# Patient Record
Sex: FEMALE | Race: WHITE | NOT HISPANIC OR LATINO | URBAN - METROPOLITAN AREA
[De-identification: names, ages, dates, MRNs, and addresses within clinical notes are randomized per-mention and may not be internally consistent; named-entity substitution may affect disease eponyms.]

---

## 2018-09-20 ENCOUNTER — HISTORICAL (OUTPATIENT)
Dept: ADMINISTRATIVE | Facility: HOSPITAL | Age: 68
End: 2018-09-20

## 2020-08-20 ENCOUNTER — HISTORICAL (OUTPATIENT)
Dept: ADMINISTRATIVE | Facility: HOSPITAL | Age: 70
End: 2020-08-20

## 2020-11-19 ENCOUNTER — HISTORICAL (OUTPATIENT)
Dept: ADMINISTRATIVE | Facility: HOSPITAL | Age: 70
End: 2020-11-19

## 2020-12-17 ENCOUNTER — HISTORICAL (OUTPATIENT)
Dept: ADMINISTRATIVE | Facility: HOSPITAL | Age: 70
End: 2020-12-17

## 2022-04-07 ENCOUNTER — HISTORICAL (OUTPATIENT)
Dept: ADMINISTRATIVE | Facility: HOSPITAL | Age: 72
End: 2022-04-07

## 2022-04-24 VITALS
BODY MASS INDEX: 25.9 KG/M2 | WEIGHT: 155.44 LBS | SYSTOLIC BLOOD PRESSURE: 163 MMHG | DIASTOLIC BLOOD PRESSURE: 95 MMHG | HEIGHT: 65 IN

## 2023-08-15 ENCOUNTER — TELEPHONE (OUTPATIENT)
Dept: ORTHOPEDICS | Facility: CLINIC | Age: 73
End: 2023-08-15

## 2023-08-15 NOTE — TELEPHONE ENCOUNTER
Patient called regarding premedicate.       I called and spoke with patient. I informed her that her sx was in 2020 and she no longer needs to pre medicate after 2 years of her sx.     Patient understood

## 2023-11-19 ENCOUNTER — ANESTHESIA (OUTPATIENT)
Dept: SURGERY | Facility: HOSPITAL | Age: 73
End: 2023-11-19
Payer: MEDICARE

## 2023-11-19 ENCOUNTER — ANESTHESIA EVENT (OUTPATIENT)
Dept: SURGERY | Facility: HOSPITAL | Age: 73
End: 2023-11-19
Payer: MEDICARE

## 2023-11-19 ENCOUNTER — HOSPITAL ENCOUNTER (EMERGENCY)
Facility: HOSPITAL | Age: 73
Discharge: HOME | End: 2023-11-19
Attending: STUDENT IN AN ORGANIZED HEALTH CARE EDUCATION/TRAINING PROGRAM | Admitting: ORTHOPAEDIC SURGERY
Payer: MEDICARE

## 2023-11-19 VITALS
HEART RATE: 70 BPM | BODY MASS INDEX: 25.83 KG/M2 | RESPIRATION RATE: 18 BRPM | HEIGHT: 65 IN | OXYGEN SATURATION: 95 % | SYSTOLIC BLOOD PRESSURE: 133 MMHG | WEIGHT: 155 LBS | DIASTOLIC BLOOD PRESSURE: 87 MMHG

## 2023-11-19 DIAGNOSIS — S73.005A HIP DISLOCATION, LEFT: ICD-10-CM

## 2023-11-19 DIAGNOSIS — S73.005A DISLOCATION OF LEFT HIP, INITIAL ENCOUNTER: Primary | ICD-10-CM

## 2023-11-19 LAB
ALBUMIN SERPL-MCNC: 4.2 G/DL (ref 3.4–4.8)
ALBUMIN/GLOB SERPL: 2.1 RATIO (ref 1.1–2)
ALP SERPL-CCNC: 78 UNIT/L (ref 40–150)
ALT SERPL-CCNC: 37 UNIT/L (ref 0–55)
AST SERPL-CCNC: 31 UNIT/L (ref 5–34)
BASOPHILS # BLD AUTO: 0.03 X10(3)/MCL
BASOPHILS NFR BLD AUTO: 0.6 %
BILIRUB SERPL-MCNC: 0.8 MG/DL
BUN SERPL-MCNC: 25.1 MG/DL (ref 9.8–20.1)
CALCIUM SERPL-MCNC: 9.2 MG/DL (ref 8.4–10.2)
CHLORIDE SERPL-SCNC: 104 MMOL/L (ref 98–107)
CO2 SERPL-SCNC: 20 MMOL/L (ref 23–31)
CREAT SERPL-MCNC: 0.83 MG/DL (ref 0.55–1.02)
EOSINOPHIL # BLD AUTO: 0.07 X10(3)/MCL (ref 0–0.9)
EOSINOPHIL NFR BLD AUTO: 1.3 %
ERYTHROCYTE [DISTWIDTH] IN BLOOD BY AUTOMATED COUNT: 12.3 % (ref 11.5–17)
GFR SERPLBLD CREATININE-BSD FMLA CKD-EPI: >60 MLS/MIN/1.73/M2
GLOBULIN SER-MCNC: 2 GM/DL (ref 2.4–3.5)
GLUCOSE SERPL-MCNC: 169 MG/DL (ref 82–115)
HCT VFR BLD AUTO: 41 % (ref 37–47)
HGB BLD-MCNC: 14.3 G/DL (ref 12–16)
IMM GRANULOCYTES # BLD AUTO: 0.06 X10(3)/MCL (ref 0–0.04)
IMM GRANULOCYTES NFR BLD AUTO: 1.1 %
LYMPHOCYTES # BLD AUTO: 0.91 X10(3)/MCL (ref 0.6–4.6)
LYMPHOCYTES NFR BLD AUTO: 17.1 %
MCH RBC QN AUTO: 32.2 PG (ref 27–31)
MCHC RBC AUTO-ENTMCNC: 34.9 G/DL (ref 33–36)
MCV RBC AUTO: 92.3 FL (ref 80–94)
MONOCYTES # BLD AUTO: 0.49 X10(3)/MCL (ref 0.1–1.3)
MONOCYTES NFR BLD AUTO: 9.2 %
NEUTROPHILS # BLD AUTO: 3.77 X10(3)/MCL (ref 2.1–9.2)
NEUTROPHILS NFR BLD AUTO: 70.7 %
NRBC BLD AUTO-RTO: 0 %
PLATELET # BLD AUTO: 158 X10(3)/MCL (ref 130–400)
PMV BLD AUTO: 10.8 FL (ref 7.4–10.4)
POTASSIUM SERPL-SCNC: 3.8 MMOL/L (ref 3.5–5.1)
PROT SERPL-MCNC: 6.2 GM/DL (ref 5.8–7.6)
RBC # BLD AUTO: 4.44 X10(6)/MCL (ref 4.2–5.4)
SODIUM SERPL-SCNC: 140 MMOL/L (ref 136–145)
TROPONIN I SERPL-MCNC: <0.01 NG/ML (ref 0–0.04)
WBC # SPEC AUTO: 5.33 X10(3)/MCL (ref 4.5–11.5)

## 2023-11-19 PROCEDURE — D9220A PRA ANESTHESIA: ICD-10-PCS | Mod: CRNA,,, | Performed by: NURSE ANESTHETIST, CERTIFIED REGISTERED

## 2023-11-19 PROCEDURE — 37000009 HC ANESTHESIA EA ADD 15 MINS: Performed by: ORTHOPAEDIC SURGERY

## 2023-11-19 PROCEDURE — 36000711: Performed by: ORTHOPAEDIC SURGERY

## 2023-11-19 PROCEDURE — 99285 PR EMERGENCY DEPT VISIT,LEVEL V: ICD-10-PCS | Mod: 57,,, | Performed by: ORTHOPAEDIC SURGERY

## 2023-11-19 PROCEDURE — 99285 EMERGENCY DEPT VISIT HI MDM: CPT | Mod: 25

## 2023-11-19 PROCEDURE — 96374 THER/PROPH/DIAG INJ IV PUSH: CPT

## 2023-11-19 PROCEDURE — 71000015 HC POSTOP RECOV 1ST HR: Performed by: ORTHOPAEDIC SURGERY

## 2023-11-19 PROCEDURE — 27266 TREAT HIP DISLOCATION: CPT | Mod: LT,,, | Performed by: ORTHOPAEDIC SURGERY

## 2023-11-19 PROCEDURE — 27266 PR CLOSED RX POST HIP FIX DISLOC,ANESTH: ICD-10-PCS | Mod: LT,,, | Performed by: ORTHOPAEDIC SURGERY

## 2023-11-19 PROCEDURE — 96361 HYDRATE IV INFUSION ADD-ON: CPT | Mod: 59

## 2023-11-19 PROCEDURE — 96375 TX/PRO/DX INJ NEW DRUG ADDON: CPT

## 2023-11-19 PROCEDURE — 37000008 HC ANESTHESIA 1ST 15 MINUTES: Performed by: ORTHOPAEDIC SURGERY

## 2023-11-19 PROCEDURE — 36000704 HC OR TIME LEV I 1ST 15 MIN: Performed by: ORTHOPAEDIC SURGERY

## 2023-11-19 PROCEDURE — 71000033 HC RECOVERY, INTIAL HOUR: Performed by: ORTHOPAEDIC SURGERY

## 2023-11-19 PROCEDURE — D9220A PRA ANESTHESIA: ICD-10-PCS | Mod: ANES,,, | Performed by: ANESTHESIOLOGY

## 2023-11-19 PROCEDURE — D9220A PRA ANESTHESIA: Mod: ANES,,, | Performed by: ANESTHESIOLOGY

## 2023-11-19 PROCEDURE — 99285 EMERGENCY DEPT VISIT HI MDM: CPT | Mod: 57,,, | Performed by: ORTHOPAEDIC SURGERY

## 2023-11-19 PROCEDURE — 93005 ELECTROCARDIOGRAM TRACING: CPT

## 2023-11-19 PROCEDURE — 63600175 PHARM REV CODE 636 W HCPCS: Performed by: NURSE ANESTHETIST, CERTIFIED REGISTERED

## 2023-11-19 PROCEDURE — 63600175 PHARM REV CODE 636 W HCPCS: Performed by: STUDENT IN AN ORGANIZED HEALTH CARE EDUCATION/TRAINING PROGRAM

## 2023-11-19 PROCEDURE — 36000705 HC OR TIME LEV I EA ADD 15 MIN: Performed by: ORTHOPAEDIC SURGERY

## 2023-11-19 PROCEDURE — 36000710: Performed by: ORTHOPAEDIC SURGERY

## 2023-11-19 PROCEDURE — 80053 COMPREHEN METABOLIC PANEL: CPT | Performed by: STUDENT IN AN ORGANIZED HEALTH CARE EDUCATION/TRAINING PROGRAM

## 2023-11-19 PROCEDURE — 84484 ASSAY OF TROPONIN QUANT: CPT | Performed by: STUDENT IN AN ORGANIZED HEALTH CARE EDUCATION/TRAINING PROGRAM

## 2023-11-19 PROCEDURE — 85025 COMPLETE CBC W/AUTO DIFF WBC: CPT | Performed by: STUDENT IN AN ORGANIZED HEALTH CARE EDUCATION/TRAINING PROGRAM

## 2023-11-19 PROCEDURE — D9220A PRA ANESTHESIA: Mod: CRNA,,, | Performed by: NURSE ANESTHETIST, CERTIFIED REGISTERED

## 2023-11-19 PROCEDURE — 25000003 PHARM REV CODE 250: Performed by: ANESTHESIOLOGY

## 2023-11-19 PROCEDURE — 25000003 PHARM REV CODE 250: Performed by: NURSE ANESTHETIST, CERTIFIED REGISTERED

## 2023-11-19 PROCEDURE — 63600175 PHARM REV CODE 636 W HCPCS: Performed by: ANESTHESIOLOGY

## 2023-11-19 RX ORDER — ACETAMINOPHEN 10 MG/ML
1000 INJECTION, SOLUTION INTRAVENOUS ONCE
Status: CANCELLED | OUTPATIENT
Start: 2023-11-19 | End: 2023-11-19

## 2023-11-19 RX ORDER — MEPERIDINE HYDROCHLORIDE 25 MG/ML
12.5 INJECTION INTRAMUSCULAR; INTRAVENOUS; SUBCUTANEOUS ONCE AS NEEDED
Status: CANCELLED | OUTPATIENT
Start: 2023-11-19 | End: 2023-11-20

## 2023-11-19 RX ORDER — DEXAMETHASONE SODIUM PHOSPHATE 4 MG/ML
INJECTION, SOLUTION INTRA-ARTICULAR; INTRALESIONAL; INTRAMUSCULAR; INTRAVENOUS; SOFT TISSUE
Status: DISCONTINUED | OUTPATIENT
Start: 2023-11-19 | End: 2023-11-19

## 2023-11-19 RX ORDER — PROPOFOL 10 MG/ML
200 VIAL (ML) INTRAVENOUS ONCE
Status: COMPLETED | OUTPATIENT
Start: 2023-11-19 | End: 2023-11-19

## 2023-11-19 RX ORDER — ONDANSETRON 2 MG/ML
INJECTION INTRAMUSCULAR; INTRAVENOUS
Status: DISCONTINUED | OUTPATIENT
Start: 2023-11-19 | End: 2023-11-19

## 2023-11-19 RX ORDER — ROCURONIUM BROMIDE 10 MG/ML
INJECTION, SOLUTION INTRAVENOUS
Status: DISCONTINUED | OUTPATIENT
Start: 2023-11-19 | End: 2023-11-19

## 2023-11-19 RX ORDER — SUCCINYLCHOLINE CHLORIDE 20 MG/ML
INJECTION INTRAMUSCULAR; INTRAVENOUS
Status: DISCONTINUED | OUTPATIENT
Start: 2023-11-19 | End: 2023-11-19

## 2023-11-19 RX ORDER — PROPOFOL 10 MG/ML
100 VIAL (ML) INTRAVENOUS ONCE
Status: DISCONTINUED | OUTPATIENT
Start: 2023-11-19 | End: 2023-11-19

## 2023-11-19 RX ORDER — PROPOFOL 10 MG/ML
VIAL (ML) INTRAVENOUS
Status: DISCONTINUED | OUTPATIENT
Start: 2023-11-19 | End: 2023-11-19

## 2023-11-19 RX ORDER — FENTANYL CITRATE 50 UG/ML
INJECTION, SOLUTION INTRAMUSCULAR; INTRAVENOUS
Status: DISCONTINUED | OUTPATIENT
Start: 2023-11-19 | End: 2023-11-19

## 2023-11-19 RX ORDER — LIDOCAINE HYDROCHLORIDE 20 MG/ML
INJECTION, SOLUTION EPIDURAL; INFILTRATION; INTRACAUDAL; PERINEURAL
Status: DISCONTINUED | OUTPATIENT
Start: 2023-11-19 | End: 2023-11-19

## 2023-11-19 RX ORDER — MIDAZOLAM HYDROCHLORIDE 1 MG/ML
2 INJECTION INTRAMUSCULAR; INTRAVENOUS ONCE AS NEEDED
Status: CANCELLED | OUTPATIENT
Start: 2023-11-19 | End: 2035-04-17

## 2023-11-19 RX ORDER — CEFAZOLIN SODIUM 2 G/50ML
2 SOLUTION INTRAVENOUS ONCE
Status: DISCONTINUED | OUTPATIENT
Start: 2023-11-19 | End: 2023-11-19

## 2023-11-19 RX ORDER — ONDANSETRON 2 MG/ML
4 INJECTION INTRAMUSCULAR; INTRAVENOUS
Status: COMPLETED | OUTPATIENT
Start: 2023-11-19 | End: 2023-11-19

## 2023-11-19 RX ORDER — METHOCARBAMOL 500 MG/1
500 TABLET, FILM COATED ORAL 3 TIMES DAILY PRN
Qty: 30 TABLET | Refills: 0 | Status: SHIPPED | OUTPATIENT
Start: 2023-11-19 | End: 2023-11-29

## 2023-11-19 RX ORDER — METOCLOPRAMIDE HYDROCHLORIDE 5 MG/ML
10 INJECTION INTRAMUSCULAR; INTRAVENOUS ONCE
Status: COMPLETED | OUTPATIENT
Start: 2023-11-19 | End: 2023-11-19

## 2023-11-19 RX ORDER — SODIUM CHLORIDE, SODIUM LACTATE, POTASSIUM CHLORIDE, CALCIUM CHLORIDE 600; 310; 30; 20 MG/100ML; MG/100ML; MG/100ML; MG/100ML
INJECTION, SOLUTION INTRAVENOUS CONTINUOUS
Status: CANCELLED | OUTPATIENT
Start: 2023-11-19

## 2023-11-19 RX ORDER — SCOLOPAMINE TRANSDERMAL SYSTEM 1 MG/1
1 PATCH, EXTENDED RELEASE TRANSDERMAL ONCE
Status: DISCONTINUED | OUTPATIENT
Start: 2023-11-19 | End: 2023-11-19 | Stop reason: HOSPADM

## 2023-11-19 RX ORDER — HYDROMORPHONE HYDROCHLORIDE 2 MG/ML
0.2 INJECTION, SOLUTION INTRAMUSCULAR; INTRAVENOUS; SUBCUTANEOUS EVERY 5 MIN PRN
Status: CANCELLED | OUTPATIENT
Start: 2023-11-19

## 2023-11-19 RX ORDER — HYDROMORPHONE HYDROCHLORIDE 2 MG/ML
1 INJECTION, SOLUTION INTRAMUSCULAR; INTRAVENOUS; SUBCUTANEOUS
Status: COMPLETED | OUTPATIENT
Start: 2023-11-19 | End: 2023-11-19

## 2023-11-19 RX ORDER — PROPOFOL 10 MG/ML
VIAL (ML) INTRAVENOUS CODE/TRAUMA/SEDATION MEDICATION
Status: COMPLETED | OUTPATIENT
Start: 2023-11-19 | End: 2023-11-19

## 2023-11-19 RX ORDER — TRAMADOL HYDROCHLORIDE 50 MG/1
50 TABLET ORAL EVERY 6 HOURS PRN
Qty: 28 TABLET | Refills: 0 | Status: SHIPPED | OUTPATIENT
Start: 2023-11-19 | End: 2023-11-26

## 2023-11-19 RX ORDER — SODIUM CITRATE AND CITRIC ACID MONOHYDRATE 334; 500 MG/5ML; MG/5ML
30 SOLUTION ORAL
Status: CANCELLED | OUTPATIENT
Start: 2023-11-19

## 2023-11-19 RX ORDER — ONDANSETRON 4 MG/1
4 TABLET, ORALLY DISINTEGRATING ORAL EVERY 6 HOURS PRN
Status: CANCELLED | OUTPATIENT
Start: 2023-11-19

## 2023-11-19 RX ORDER — LIDOCAINE HYDROCHLORIDE 10 MG/ML
1 INJECTION, SOLUTION EPIDURAL; INFILTRATION; INTRACAUDAL; PERINEURAL ONCE
Status: CANCELLED | OUTPATIENT
Start: 2023-11-19 | End: 2023-11-19

## 2023-11-19 RX ADMIN — PROPOFOL 70 MG: 10 INJECTION, EMULSION INTRAVENOUS at 03:11

## 2023-11-19 RX ADMIN — ROCURONIUM BROMIDE 5 MG: 10 SOLUTION INTRAVENOUS at 04:11

## 2023-11-19 RX ADMIN — HYDROMORPHONE HYDROCHLORIDE 1 MG: 2 INJECTION INTRAMUSCULAR; INTRAVENOUS; SUBCUTANEOUS at 01:11

## 2023-11-19 RX ADMIN — LIDOCAINE HYDROCHLORIDE 80 MG: 20 INJECTION, SOLUTION EPIDURAL; INFILTRATION; INTRACAUDAL; PERINEURAL at 04:11

## 2023-11-19 RX ADMIN — DEXAMETHASONE SODIUM PHOSPHATE 4 MG: 4 INJECTION, SOLUTION INTRA-ARTICULAR; INTRALESIONAL; INTRAMUSCULAR; INTRAVENOUS; SOFT TISSUE at 04:11

## 2023-11-19 RX ADMIN — ONDANSETRON 4 MG: 2 INJECTION INTRAMUSCULAR; INTRAVENOUS at 04:11

## 2023-11-19 RX ADMIN — PROPOFOL 30 MG: 10 INJECTION, EMULSION INTRAVENOUS at 03:11

## 2023-11-19 RX ADMIN — FENTANYL CITRATE 50 MCG: 50 INJECTION, SOLUTION INTRAMUSCULAR; INTRAVENOUS at 04:11

## 2023-11-19 RX ADMIN — PROPOFOL 150 MG: 10 INJECTION, EMULSION INTRAVENOUS at 04:11

## 2023-11-19 RX ADMIN — PROPOFOL 20 MG: 10 INJECTION, EMULSION INTRAVENOUS at 03:11

## 2023-11-19 RX ADMIN — PROPOFOL 10 MG: 10 INJECTION, EMULSION INTRAVENOUS at 03:11

## 2023-11-19 RX ADMIN — ROCURONIUM BROMIDE 25 MG: 10 SOLUTION INTRAVENOUS at 04:11

## 2023-11-19 RX ADMIN — METOCLOPRAMIDE 10 MG: 5 INJECTION, SOLUTION INTRAMUSCULAR; INTRAVENOUS at 07:11

## 2023-11-19 RX ADMIN — SODIUM CHLORIDE: 9 INJECTION, SOLUTION INTRAVENOUS at 04:11

## 2023-11-19 RX ADMIN — SUGAMMADEX 200 MG: 100 INJECTION, SOLUTION INTRAVENOUS at 05:11

## 2023-11-19 RX ADMIN — ONDANSETRON 4 MG: 2 INJECTION INTRAMUSCULAR; INTRAVENOUS at 01:11

## 2023-11-19 RX ADMIN — SUCCINYLCHOLINE CHLORIDE 140 MG: 20 INJECTION, SOLUTION INTRAMUSCULAR; INTRAVENOUS at 04:11

## 2023-11-19 RX ADMIN — SCOPOLAMINE 1 PATCH: 1 PATCH TRANSDERMAL at 07:11

## 2023-11-19 RX ADMIN — SODIUM CHLORIDE, POTASSIUM CHLORIDE, SODIUM LACTATE AND CALCIUM CHLORIDE 1000 ML: 600; 310; 30; 20 INJECTION, SOLUTION INTRAVENOUS at 01:11

## 2023-11-19 NOTE — ANESTHESIA PROCEDURE NOTES
Intubation    Date/Time: 11/19/2023 4:53 PM    Performed by: Jonathan Mccormick CRNA  Authorized by: Jonathan Mccormick CRNA    Intubation:     Induction:  Rapid sequence induction    Intubated:  Postinduction    Mask Ventilation:  Not attempted    Attempts:  1    Attempted By:  CRNA    Method of Intubation:  Direct    Blade:  Cantu 2    Laryngeal View Grade: Grade I - full view of cords      Difficult Airway Encountered?: No      Complications:  None    Airway Device:  Oral endotracheal tube    Airway Device Size:  7.0    Style/Cuff Inflation:  Cuffed (inflated to minimal occlusive pressure)    Tube secured:  22    Secured at:  The lips    Placement Verified By:  Capnometry    Complicating Factors:  None    Findings Post-Intubation:  BS equal bilateral and atraumatic/condition of teeth unchanged

## 2023-11-19 NOTE — ED PROVIDER NOTES
Encounter Date: 11/19/2023    SCRIBE #1 NOTE: I, Jim Jeff, am scribing for, and in the presence of,  Bryson Puentes MD. I have scribed the following portions of the note - Other sections scribed: HPI, ROS, PE.       History     Chief Complaint   Patient presents with    Hip Pain     AIR [facility] from home, was bending over and felt a pop in left hip area with pain, fell over without trauma to head, LOC, no blood thinners. GCS 15. Reports pain to left hip. [facility] gave morphine 10mg then ketamine 60mg en route. Bilateral hips replaced approximately 20 years ago     71 y/o female presents to the ED via [facility] with left hip pain. Pt reports that she bent over and her left hip popped inward. She denies falling and LOC. Pt notes that she has had something similar happen to her right hip. Given 10 of morphine and 60 of ketamine en route by [facility]. Surgical history of bilateral hip replacement 21 years ago.     The history is provided by the patient and the EMS personnel. No  was used.     Review of patient's allergies indicates:   Allergen Reactions    Oxycodone-acetaminophen     Tetracycline      No past medical history on file.  Past Surgical History:   Procedure Laterality Date    CLOSED REDUCTION OF INJURY OF HIP Left 11/19/2023    Procedure: CLOSED REDUCTION, HIP;  Surgeon: Gutierrez Joiner MD;  Location: [facility];  Service: Orthopedics;  Laterality: Left;     No family history on file.     Review of Systems   Constitutional:  Negative for chills and fever.   HENT:  Negative for congestion, drooling and sore throat.    Eyes:  Negative for pain and visual disturbance.   Respiratory:  Negative for chest tightness, shortness of breath and wheezing.    Cardiovascular:  Negative for chest pain, palpitations and leg swelling.   Gastrointestinal:  Negative for abdominal pain, nausea and vomiting.   Genitourinary:  Negative for dysuria and hematuria.   Musculoskeletal:  Positive for arthralgias  (left hip). Negative for back pain, neck pain and neck stiffness.   Skin:  Negative for pallor and rash.   Neurological:  Negative for weakness and numbness.   Hematological:  Does not bruise/bleed easily.       Physical Exam     Initial Vitals [11/19/23 1327]   BP Pulse Resp Temp SpO2   137/66 92 (!) 22 -- 95 %      MAP       --         Physical Exam    Nursing note and vitals reviewed.  Constitutional: She appears well-developed and well-nourished. She is not diaphoretic. No distress.   HENT:   Head: Normocephalic and atraumatic.   Nose: Nose normal.   Mouth/Throat: Oropharynx is clear and moist.   Eyes: EOM are normal. Pupils are equal, round, and reactive to light.   Neck: Neck supple.   Normal range of motion.  Cardiovascular:  Normal rate and regular rhythm.           No murmur heard.  Pulmonary/Chest: Breath sounds normal. No respiratory distress. She has no wheezes. She has no rales.   Abdominal: Abdomen is soft. She exhibits no distension. There is no abdominal tenderness.   Musculoskeletal:      Cervical back: Normal range of motion and neck supple.      Comments: The left leg is shortened and internally rotated, intact pulse and sensation, chronic decreased weakness from previous neuropathy  No external signs of trauma     Neurological: She is alert and oriented to person, place, and time. No cranial nerve deficit or sensory deficit.   Skin: Skin is warm. Capillary refill takes less than 2 seconds. No rash noted.         ED Course   Orthopedic Injury    Date/Time: 11/19/2023 4:04 PM    Performed by: Bryson Puentes MD  Authorized by: Bryson Puentes MD    Location procedure was performed:  [facility] EMERGENCY DEPARTMENT  Pre-operative diagnosis:  Prosthetic hip dislocation  Post-operative diagnosis:  Prosthetic hip dislocation  Injury:     Injury location:  Hip    Location details:  Left hip    Injury type:  Dislocation      Pre-procedure assessment:     Distal perfusion: normal      Neurological  function: normal      Range of motion: reduced      Patient sedated?: Yes      ASA Class:  Class 3 - Systemic Illness with functional impairment.    Mallampati Score:  Class 2 - Visualization of the soft palate, fauces, and uvula.    Patient/Family history of anesthesia or sedation complications: No      Sedation type: moderate (conscious) sedation      Sedation:  Propofol    Sedation start:  11/19/2023 3:30 PM    Sedation end:  11/19/2023 4:00 PM    Vital signs: Vital signs monitored during sedation        Selections made in this section will also lock the Injury type section above.:     Manipulation performed?: Yes      Reduction method:  Traction and counter traction, Allis maneuver, abduction, extension and external rotation    Reduction method:  Traction and counter traction, Allis maneuver, abduction, extension and external rotation    Reduction method:  Traction and counter traction, Allis maneuver, abduction, extension and external rotation    Reduction method:  Traction and counter traction, Allis maneuver, abduction, extension and external rotation    Reduction method:  Traction and counter traction, Allis maneuver, abduction, extension and external rotation    Reduction method:  Traction and counter traction, Allis maneuver, abduction, extension and external rotation    Reduction successful?: No    Post-procedure assessment:     Neurovascular status: Neurovascularly intact      Distal perfusion: normal      Neurological function: normal      Range of motion: unchanged      Patient tolerance:  Patient tolerated the procedure well with no immediate complications    Labs Reviewed   COMPREHENSIVE METABOLIC PANEL - Abnormal; Notable for the following components:       Result Value    Carbon Dioxide 20 (*)     Glucose Level 169 (*)     Blood Urea Nitrogen 25.1 (*)     Globulin 2.0 (*)     Albumin/Globulin Ratio 2.1 (*)     All other components within normal limits   CBC WITH DIFFERENTIAL - Abnormal; Notable for  the following components:    MCH 32.2 (*)     MPV 10.8 (*)     IG# 0.06 (*)     All other components within normal limits   TROPONIN I - Normal   CBC W/ AUTO DIFFERENTIAL    Narrative:     The following orders were created for panel order CBC auto differential.  Procedure                               Abnormality         Status                     ---------                               -----------         ------                     CBC with Differential[[id number]]       Abnormal            Final result                 Please view results for these tests on the individual orders.        ECG Results              EKG 12-lead (Final result)  Result time 11/19/23 14:26:16      Final result by Interface, Lab [facility] (11/19/23 14:26:16)                   Narrative:    Test Reason : S73.005A,    Vent. Rate : 079 BPM     Atrial Rate : 079 BPM     P-R Int : 140 ms          QRS Dur : 070 ms      QT Int : 382 ms       P-R-T Axes : 073 059 061 degrees     QTc Int : 438 ms    Normal sinus rhythm  Normal ECG  No previous ECGs available  Confirmed by Floyd Freedman MD ([id number]) on 11/19/2023 2:26:08 PM    Referred By:             Confirmed By:Floyd Freedman MD                                  Imaging Results              SURG FL Surgery Fluoro Usage (Final result)  Result time 11/19/23 17:16:56      Final result by Access, Silent  (11/19/23 17:16:56)                   Narrative:    See OP Notes for results.     IMPRESSION: See OP Notes for results.             This procedure was auto-finalized by: Virtual Radiologist                                     X-Ray Hip 2 or 3 views Left (with Pelvis when performed) (Final result)  Result time 11/19/23 16:28:45      Final result by Hima Dawkins MD (11/19/23 16:28:45)                   Impression:      No significant interval change.      Electronically signed by: Hima Dawkins  Date:    11/19/2023  Time:    16:28               Narrative:    EXAMINATION:  XR HIP WITH PELVIS  WHEN PERFORMED, 2 OR 3 VIEWS LEFT    CLINICAL HISTORY:  Hip dislocation.    TECHNIQUE:  Three views    COMPARISON:  Same date    FINDINGS:  There is again superior dislocation of the left femoral component of the hip arthroplasty.  Demineralization of bones.                                       X-Ray Chest AP Portable (Final result)  Result time 11/19/23 14:18:34      Final result by Sawyer Chisholm MD (11/19/23 14:18:34)                   Impression:      No acute findings.      Electronically signed by: Sawyer Chisholm  Date:    11/19/2023  Time:    14:18               Narrative:    EXAMINATION:  XR CHEST AP PORTABLE    CLINICAL HISTORY:  Unspecified dislocation of left hip, initial encounter    COMPARISON:  6 October 2020    FINDINGS:  Portable frontal view of the chest was obtained. Left lung base incompletely imaged.  The heart is not enlarged.  Lungs grossly clear.  There is no pneumothorax or significant effusion.                                       X-Ray Hip 2 or 3 views Left (with Pelvis when performed) (Final result)  Result time 11/19/23 14:13:11      Final result by Sawyer Chisholm MD (11/19/23 14:13:11)                   Impression:      Superior dislocation left hip arthroplasty.      Electronically signed by: Sawyer Chisholm  Date:    11/19/2023  Time:    14:13               Narrative:    EXAMINATION:  XR HIP WITH PELVIS WHEN PERFORMED, 2 OR 3 VIEWS LEFT    CLINICAL HISTORY:  hip dislocation;    COMPARISON:  20 September 2018    FINDINGS:  Frontal view of the pelvis with two views of the left hip.  There are bilateral hip arthroplasties.  There is superior displacement of the left hip arthroplasty femoral component.  No acute fracture is evident..                                       Medications   HYDROmorphone (PF) injection 1 mg (1 mg Intravenous Given 11/19/23 1336)   ondansetron injection 4 mg (4 mg Intravenous Given 11/19/23 1336)   lactated ringers bolus 1,000 mL (0 mLs Intravenous Stopped  11/19/23 1436)   propofol (DIPRIVAN) 10 mg/mL IVP (0 mg Intravenous Override Pull 11/19/23 1530)   propofol (DIPRIVAN) 10 mg/mL IVP (30 mg Intravenous Given 11/19/23 1546)   metoclopramide HCl injection 10 mg (10 mg Intravenous Given 11/19/23 1919)     Medical Decision Making  Problems Addressed:  Hip dislocation, left: acute illness or injury with systemic symptoms    Amount and/or Complexity of Data Reviewed  Labs: ordered.  Radiology: ordered.  ECG/medicine tests: ordered and independent interpretation performed. Decision-making details documented in ED Course.    Risk  Prescription drug management.      Differential diagnosis (includes but is not limited to):   Hip dislocation, fracture, periprosthetic fracture, neurovascular injury    MDM Narrative  72-year-old female arrives by air Care for a left hip injury, presumed dislocation.  Patient does have a history of bilateral prosthetic hips.  She states she bent to the side and felt a pop and then her leg gave out.  She is been unable to move it since.  She received 10 mg of morphine and then 60 mg of ketamine EN route with air evac.  The original hip surgeries were over 20 years ago per patient.  X-rays pending.  Continue pain and nausea control.  IV fluid rehydration ordered.  Labs are pending.  Patient will require sedation for reduction attempt.      Update:  X-ray does show what appears to be a dislocation of the patient's prosthetic hip.  Informed consent obtained for both conscious sedation and closed reduction of the left hip dislocation.  Closed reduction at bedside attempted with no significant interval change and unsuccessful reduction.  Orthopedic surgery consulted and has evaluated the patient, they will take the patient to the operating room for reduction.  Patient and family at bedside agree with plan of care and have no questions at this time.  Tentative plan for discharge after reduction from PACU as long as patient remains stable and is doing  well.    Dispo: Discharge    My independent radiology interpretation: as above  Point of care US (independently performed and interpreted):   Decision rules/clinical scoring:     Sepsis Perfusion Assessment:     Amount and/or Complexity of Data Reviewed  Independent historian: EMS   Summary of history: Report received from EMS on arrival  External data reviewed: notes from previous admissions, notes from previous ED visits, and notes from clinic visits  Summary of data reviewed: Prior records reviewed  Risk and benefits of testing: discussed   Labs: ordered and reviewed  Radiology: ordered and independent interpretation performed (see above or ED course)  ECG/medicine tests: ordered and independent interpretation performed (see above or ED course)  Discussion of management or test interpretation with external provider(s): discussed with orthopedic surgery consultant   Summary of discussion: as above    Risk  Prescription drug management   Parenteral controlled substances   Drug therapy requiring intense monitoring for toxicity   Decision regarding hospitalization  Emergency major surgery   Shared decision making     Critical Care  none    Data Reviewed/Counseling: I have personally reviewed the patient's vital signs, nursing notes, and other relevant tests, information, and imaging. I had a detailed discussion regarding the historical points, exam findings, and any diagnostic results supporting the discharge diagnosis. I personally performed the history, PE, MDM and procedures as documented above and agree with the scribe's documentation.    Portions of this note were dictated using voice recognition software. Although it was reviewed for accuracy, some inherent voice recognition errors may have occurred and may be present in this document.         Scribe Attestation:   Scribe #1: I performed the above scribed service and the documentation accurately describes the services I performed. I attest to the accuracy of  the note.    Attending Attestation:           Physician Attestation for Scribe:  Physician Attestation Statement for Scribe #1: I, Bryson Puentes MD, reviewed documentation, as scribed by Jim Jeff in my presence, and it is both accurate and complete.             ED Course as of 11/23/23 1610   Sun Nov 19, 2023   1600 Bedside closed reduction unsuccessful.  Orthopedic surgery consulted and has evaluated the patient.  Dr. Joiner will take the patient to the operating room for reduction. []   1622 EKG independently interpreted by me.  EKG: NSR @ 79, no STEMI, Qtc 438 []      ED Course User Index  [] Bryson Puetnes MD                        Clinical Impression:  Final diagnoses:  [S73.005A] Hip dislocation, left          ED Disposition Condition    Discharge Stable          ED Prescriptions       Medication Sig Dispense Start Date End Date Auth. Provider    traMADoL (ULTRAM) 50 mg tablet Take 1 tablet (50 mg total) by mouth every 6 (six) hours as needed for Pain. 28 tablet 11/19/2023 11/26/2023 Gutierrez Joiner MD    methocarbamoL (ROBAXIN) 500 MG Tab Take 1 tablet (500 mg total) by mouth 3 (three) times daily as needed (spasm). 30 tablet 11/19/2023 11/29/2023 Gutierrez Joiner MD          Follow-up Information       Follow up With Specialties Details Why Contact Info    Dejan Chaidez MD Orthopedic Surgery Follow up in 1 week(s)  [address]  [address]  [facility] [address]  [phone]               Bryson Puentes MD  11/23/23 1610

## 2023-11-19 NOTE — DISCHARGE INSTRUCTIONS
Informed patient to use knee immobilizer until seen by Dr. Joiner; can use a walker if needed as well;

## 2023-11-19 NOTE — TRANSFER OF CARE
Anesthesia Transfer of Care Note    Patient: Jyoti Shabazz    Procedure(s) Performed: Procedure(s) (LRB):  CLOSED REDUCTION, left hip (Left)  HEMIARTHROPLASTY, HIP    Patient location: PACU    Anesthesia Type: general    Transport from OR: Transported from OR on room air with adequate spontaneous ventilation    Post pain: adequate analgesia    Post assessment: no apparent anesthetic complications and tolerated procedure well    Post vital signs: stable    Level of consciousness: awake, alert and oriented    Nausea/Vomiting: no nausea/vomiting    Complications: none    Transfer of care protocol was followed

## 2023-11-19 NOTE — ANESTHESIA PREPROCEDURE EVALUATION
11/19/2023  Jyoti Shabazz is a 72 y.o., female with medical problems listed in the EMR      Pre-op Assessment    I have reviewed the Patient Summary Reports.     I have reviewed the Nursing Notes. I have reviewed the NPO Status.   I have reviewed the Medications.     Review of Systems  Cardiovascular:  Exercise tolerance: good                                               Physical Exam  General: Well nourished and Cooperative    Airway:  Mallampati: II   Mouth Opening: Normal  TM Distance: Normal  Tongue: Normal  Neck ROM: Normal ROM    Dental:  Intact    Chest/Lungs:  Clear to auscultation    Heart:  Rhythm: Regular Rhythm        Anesthesia Plan  Type of Anesthesia, risks & benefits discussed:    Anesthesia Type: Gen ETT  Intra-op Monitoring Plan: Standard ASA Monitors  Post Op Pain Control Plan: multimodal analgesia  Induction:  IV  Informed Consent: Informed consent signed with the Patient and all parties understand the risks and agree with anesthesia plan.  All questions answered.   ASA Score: 2  Day of Surgery Review of History & Physical: H&P Update referred to the surgeon/provider.    Ready For Surgery From Anesthesia Perspective.     .  I explained anesthesia plan to patient/responsbile party if available.  Anesthesia consent done going over the material facts, risks, complications & alternatives, obtained which includes the possibility of altering the anesthesia plan.  I reviewed problem list, prior to admission medication list, appropriate labs, any workup, Xray, EKG etc noted below.  Patients condition is satisfactory to proceed with anesthesia plan unless otherwise noted (see anesthesia chart for details of the anesthesia plan carried out).      Pre-operative evaluation for Procedure(s) (LRB):  CLOSED REDUCTION, left hip (Left)  HEMIARTHROPLASTY, HIP    BP (!) 147/89   Pulse 73   Resp 18   Ht  "5' 5" (1.651 m)   Wt 70.3 kg (155 lb)   SpO2 100%   BMI 25.79 kg/m²     There is no problem list on file for this patient.      Review of patient's allergies indicates:   Allergen Reactions    Oxycodone-acetaminophen     Tetracycline        No current outpatient medications    No past surgical history on file.         Lab Results   Component Value Date    WBC 5.33 11/19/2023    HGB 14.3 11/19/2023    HCT 41.0 11/19/2023    MCV 92.3 11/19/2023     11/19/2023          BMP  Lab Results   Component Value Date    HCT 41.0 11/19/2023     11/19/2023    K 3.8 11/19/2023    BUN 25.1 (H) 11/19/2023    CREATININE 0.83 11/19/2023    CALCIUM 9.2 11/19/2023        INR  No results for input(s): "PT", "INR", "PROTIME", "APTT" in the last 72 hours.        Diagnostic Studies:      EKG:  Results for orders placed or performed during the hospital encounter of 11/19/23   EKG 12-lead    Collection Time: 11/19/23  1:35 PM    Narrative    Test Reason : S73.005A,    Vent. Rate : 079 BPM     Atrial Rate : 079 BPM     P-R Int : 140 ms          QRS Dur : 070 ms      QT Int : 382 ms       P-R-T Axes : 073 059 061 degrees     QTc Int : 438 ms    Normal sinus rhythm  Normal ECG  No previous ECGs available  Confirmed by Floyd Freedman MD ([id number]) on 11/19/2023 2:26:08 PM    Referred By:             Confirmed By:Floyd Freedman MD            "

## 2023-11-20 NOTE — OP NOTE
[facility] [place]                       [address]                      [place] [address]    PATIENT NAME:      HORTENSIA ALEJANDRA  YOB: 1950  CSN:               [id number]  MRN:               [id number]  ADMIT DATE:        11/19/2023 13:31:00  PHYSICIAN:         Gutierrez Joiner MD                          OPERATIVE REPORT      DATE OF SURGERY:    11/19/2023 00:00:00    SURGEON:  Gutierrez Joiner MD    PREOPERATIVE DIAGNOSIS:  Left prosthetic hip dislocation.    POSTOPERATIVE DIAGNOSIS:  Left prosthetic hip dislocation.    PROCEDURE:  Closed reduction of left hip dislocation.    ANESTHESIA:  General.    COMPLICATIONS:  None.    INDICATIONS FOR PROCEDURE:  The patient is a 72-year-old female who bent over to   pick something up off the ground.  She felt a pop in her hip.  She has a   history of a total hip arthroplasty performed 20 years ago.  She was seen in the   emergency department at [facility].  They were   unsuccessful in closed manipulation of her hip with sedation.  Orthopedics was   consulted for evaluation and management.  The risks and benefits of treatment   were discussed.  She was brought to the operating room for paralysis and closed   manipulation of the left hip.  She understood and agreed with all that we   discussed and was brought to the operating room tonight.    PROCEDURE IN DETAIL:  After informed consent was obtained, the patient was met   in the preoperative holding area and site was marked.  She was taken to the   operating room.  She was placed supine on the operating table.  General   anesthesia was induced.  All bony prominences were padded.  A time-out was done.    A closed manipulation of the hip was performed under fluoroscopy.  Her hip was   successfully reduced.  It was noted to be stable.  Live fluoro showed that she   had no subluxation with flexion to 70 degrees.  She had no evidence  of fracture   or loosening of her prosthesis.  She was awakened, extubated, and taken to   recovery in stable condition.    POSTOPERATIVE PLAN:  She will be discharged home.  She can weightbear to the   left lower extremity.  We discussed posterior hip precautions prior to her   procedure.  We will plan to have her follow up with my partner, Dr. Dejan Chaidez   in approximately 1 week.  She is established with Dr. Chaidez and knows him   well.        ______________________________  MD VALENCIA Richardson/AQS  DD:  11/19/2023  Time:  05:19PM  DT:  11/20/2023  Time:  12:32AM  Job #:  [id number]      OPERATIVE REPORT

## 2023-11-20 NOTE — H&P
[facility]                       [address]                      [place] [address]    PATIENT NAME:       HORTENSIA ALEJANDRA  YOB: 1950  CSN:                [id number]   MRN:                [id number]  ADMIT DATE:         11/19/2023 13:31:00  PHYSICIAN:          Gutierrez Joiner MD                        HISTORY AND PHYSICAL      ADMISSION DIAGNOSIS:  Dislocation of left hip prosthesis.    CHIEF COMPLAINT:  Left hip pain.    HISTORY OF PRESENT ILLNESS:  The patient is a 72-year-old female who has a   history of a total hip arthroplasty on the left done approximately 20 years ago   by Dr. Dejan Chaidez.  She states that she bent down to pick something off the   ground.  She felt a pop and was unable to bear weight.  She was air-evaced to   [facility] and she had no loss of consciousness.  She is   on no blood thinners.  They attempted to perform closed reduction of her hip in   the emergency department unsuccessfully and Orthopedics was consulted for   evaluation.  Upon my evaluation at the bedside, she is awake from her sedation.    She is complaining of muscle spasms and pain in the left hip.  She is lying   supine.  Her  is at the bedside with her.    REVIEW OF SYSTEMS:  All reported negative aside from HPI  Constitutional: Negative for chills and fever.   HENT: Negative for congestion and hearing loss.    Eyes: Negative for visual disturbance.   Cardiovascular: Negative for chest pain and syncope.   Respiratory: Negative for cough and shortness of breath.    Hematologic/Lymphatic: Does not bruise/bleed easily.   Skin: Negative for color change and rash.   Gastrointestinal: Negative for abdominal pain, nausea and vomiting.   Genitourinary: Negative for dysuria and hematuria.   Neurological: Negative for numbness, sensory change and weakness.   Psychiatric/Behavioral: Negative for altered mental status.        PHYSICAL EXAMINATION:  GENERAL:  She is in no apparent distress.  She is grimacing in pain occasionally   when she has a spasm.  HEAD, EYES, EARS, NOSE, THROAT:  Her extraocular movements are intact.  She is   normocephalic, atraumatic.  PULMONARY:  She has unlabored respirations.  Symmetric chest rise.  CARDIOVASCULAR:  She has normal rate with a regular rhythm.  She has normal   peripheral perfusion.  GI:  Her abdomen is soft, nontender, and nondistended.  INTEGUMENTARY:  Her skin is warm, dry, and intact.  MUSCULOSKELETAL:  Evaluation of her left lower extremity, her hip is sitting   slightly flexed, so is her knee, and her limb is internally rotated compared to   the right side and slightly shortened.  She has a palpable DP pulse.  5/5 motor   strength in dorsiflexion, plantar flexion of the ankle and digits.  Her   sensation to light touch is intact distally.    IMAGING DATA:  X-ray evaluations reveal dislocation of the prosthesis on her   left hip with no evidence of fracture.    PLAN:  The risks and benefits of treatment were discussed at length with the   patient.  She will benefit from closed manipulation of the hip in order to   reduce dislocation.  We discussed posterior hip precautions once her hip is back   into place.  On the rare circumstances that we are unable to successfully   reduce her hip, she will need to be transferred over to [facility] for management by one of my partners for revision hip   replacement versus open reduction.  She understands and agrees with all that we   have discussed and all questions and concerns were addressed.        ______________________________  MD VALENCIA Richardson/CARL  DD:  11/19/2023  Time:  04:38PM  DT:  11/19/2023  Time:  07:12PM  Job #:  [id number]      HISTORY AND PHYSICAL

## 2023-11-21 NOTE — ANESTHESIA POSTPROCEDURE EVALUATION
Anesthesia Post Evaluation    Patient: Jyoti Shabazz    Procedure(s) Performed: Procedure(s) (LRB):  CLOSED REDUCTION, HIP (Left)    Final Anesthesia Type: general (/Regional//MAC)      Patient location during evaluation: PACU  Post-procedure mental status: @ basline.  Pain management: adequate    PONV status: See postop meds for drugs used to control n/v if any.  Anesthetic complications: no      Cardiovascular status: blood pressure returned to baseline  Respiratory status: @ baseline.  Hydration status: euvolemic            Vitals Value Taken Time   /79 11/19/23 1821   Temp 98 11/21/23 1035   Pulse 67 11/19/23 1825   Resp 16 11/19/23 1825   SpO2 98 % 11/19/23 1825   Vitals shown include unvalidated device data.      Event Time   Out of Recovery 17:51:00         Pain/Rosangela Score: No data recorded

## 2023-11-28 ENCOUNTER — TELEPHONE (OUTPATIENT)
Dept: ORTHOPEDICS | Facility: CLINIC | Age: 73
End: 2023-11-28

## 2023-11-28 ENCOUNTER — OFFICE VISIT (OUTPATIENT)
Dept: ORTHOPEDICS | Facility: CLINIC | Age: 73
End: 2023-11-28
Payer: MEDICARE

## 2023-11-28 ENCOUNTER — HOSPITAL ENCOUNTER (OUTPATIENT)
Dept: RADIOLOGY | Facility: CLINIC | Age: 73
Discharge: HOME | End: 2023-11-28
Attending: PHYSICIAN ASSISTANT
Payer: MEDICARE

## 2023-11-28 VITALS
BODY MASS INDEX: 25.83 KG/M2 | HEART RATE: 77 BPM | WEIGHT: 155 LBS | HEIGHT: 65 IN | DIASTOLIC BLOOD PRESSURE: 103 MMHG | SYSTOLIC BLOOD PRESSURE: 157 MMHG

## 2023-11-28 DIAGNOSIS — M25.552 LEFT HIP PAIN: ICD-10-CM

## 2023-11-28 DIAGNOSIS — S73.005A DISLOCATION OF LEFT HIP, INITIAL ENCOUNTER: ICD-10-CM

## 2023-11-28 DIAGNOSIS — M25.552 LEFT HIP PAIN: Primary | ICD-10-CM

## 2023-11-28 PROCEDURE — 99213 PR OFFICE/OUTPT VISIT, EST, LEVL III, 20-29 MIN: ICD-10-PCS | Mod: ,,, | Performed by: PHYSICIAN ASSISTANT

## 2023-11-28 PROCEDURE — 73502 X-RAY EXAM HIP UNI 2-3 VIEWS: CPT | Mod: LT,,, | Performed by: PHYSICIAN ASSISTANT

## 2023-11-28 PROCEDURE — 99213 OFFICE O/P EST LOW 20 MIN: CPT | Mod: ,,, | Performed by: PHYSICIAN ASSISTANT

## 2023-11-28 PROCEDURE — 73502 XR HIP WITH PELVIS WHEN PERFORMED, 2 OR 3 VIEWS LEFT: ICD-10-PCS | Mod: LT,,, | Performed by: PHYSICIAN ASSISTANT

## 2023-11-28 RX ORDER — GABAPENTIN 600 MG/1
600 TABLET ORAL 3 TIMES DAILY
COMMUNITY
Start: 2023-09-18

## 2023-11-28 RX ORDER — FUROSEMIDE 20 MG/1
20 TABLET ORAL
COMMUNITY
Start: 2023-09-18

## 2023-11-28 RX ORDER — MELOXICAM 15 MG/1
15 TABLET ORAL
COMMUNITY
Start: 2023-09-17

## 2023-11-29 NOTE — TELEPHONE ENCOUNTER
I called patient back to inform her that I spoke with Marciano and he stated that it was ok to drive she would just have to be careful. I did advise her that she can practice in a small parking lot or around the neighborhood to see how she would feel.     Patient agreed and will call back with any other questions or concerns.

## 2023-12-11 NOTE — PROGRESS NOTES
"Chief Complaint:   Chief Complaint   Patient presents with    Follow-up     L hip when she was reaching into her cabinet her hip would normally pop out. Getting back up is goes back in place. Reports this last time it did not. She reported to the ER and was put back in place. Last night was the first night she took the hip brace to shower. No pain ambulating with a walker.        History of present illness:    This is a 72 y.o. year old female who complains of follow up for a left hip dislocation with a closed reduction.    Patient states that she was reaching until 1 her cabinets and she was getting back up and felt a pop in her hip pain   She can not get it to go back in place and went to the ER pain   Patient was seen by Dr. Joiner have her had her hip close reduced and placed in a knee immobilizer in his here today for follow up      Current Outpatient Medications   Medication Sig    furosemide (LASIX) 20 MG tablet Take 20 mg by mouth.    gabapentin (NEURONTIN) 600 MG tablet Take 600 mg by mouth 3 (three) times daily.    meloxicam (MOBIC) 15 MG tablet Take 15 mg by mouth.     No current facility-administered medications for this visit.       Review of Systems:    Constitution:   Denies chills, fever, and sweats.  HENT:   Denies headaches or blurry vision.  Cardiovascular:  Denies chest pain or irregular heart beat.  Respiratory:   Denies cough or shortness of breath.  Gastrointestinal:  Denies abdominal pain, nausea, or vomiting.  Musculoskeletal:   Denies muscle cramps.  Neurological:   Denies dizziness or focal weakness.  Psychiatric/Behavior: Normal mental status.  Hematology/Lymph:  Denies bleeding problem or easy bruising/bleeding.  Skin:    Denies rash or suspicious lesions.    Examination:    Vital Signs:    Vitals:    11/28/23 0934   BP: (!) 157/103   Pulse: 77   Weight: 70.3 kg (155 lb)   Height: 5' 5" (1.651 m)       Body mass index is 25.79 kg/m².    Constitution:   Well-developed, well nourished " patient in no acute distress.  Neurological:   Alert and oriented x 3 and cooperative to examination.     Psychiatric/Behavior: Normal mental status.  Respiratory:   No shortness of breath.  Eyes:    Extraoccular muscles intact  Skin:    No scars, rash or suspicious lesions.    Physical Exam:   Left hip exam.    Patient has mild soreness with rotation flexion of the hip.    She is walking with a mildly altered gait today.    Intact motor sensation left lower extremity.    No radicular symptoms.    No lower back pain.    Imaging: X-rays ordered and images interpreted today personally by me of left hip three views.    Patient has a reduced left hip in good position.            Assessment: Left hip pain  -     X-Ray Hip 2 or 3 views Left (with Pelvis when performed); Future; Expected date: 11/28/2023    Dislocation of left hip, initial encounter         Plan:  At this point the patient is removed from her knee immobilizer  She was going to start ambulating without the knee immobilizer on being careful over hip precautions.    Patient was hip was put in over 20 years ago.    She will follow up with Dr. Gaston for discussion of possible hip revision as her polyethylene liner might be wearing           DISCLAIMER: This note may have been dictated using voice recognition software and may contain grammatical errors.     NOTE: Consult report sent to referring provider via [facility]MR.

## 2024-01-22 ENCOUNTER — HOSPITAL ENCOUNTER (OUTPATIENT)
Dept: RADIOLOGY | Facility: CLINIC | Age: 74
Discharge: HOME | End: 2024-01-22
Attending: SPECIALIST
Payer: MEDICARE

## 2024-01-22 ENCOUNTER — OFFICE VISIT (OUTPATIENT)
Dept: ORTHOPEDICS | Facility: CLINIC | Age: 74
End: 2024-01-22
Payer: MEDICARE

## 2024-01-22 VITALS
BODY MASS INDEX: 25.49 KG/M2 | WEIGHT: 153 LBS | HEART RATE: 69 BPM | DIASTOLIC BLOOD PRESSURE: 94 MMHG | HEIGHT: 65 IN | SYSTOLIC BLOOD PRESSURE: 138 MMHG

## 2024-01-22 DIAGNOSIS — Z96.642 HX OF TOTAL HIP ARTHROPLASTY, LEFT: ICD-10-CM

## 2024-01-22 DIAGNOSIS — S73.005D DISLOCATION OF LEFT HIP, SUBSEQUENT ENCOUNTER: Primary | ICD-10-CM

## 2024-01-22 DIAGNOSIS — S73.005D DISLOCATION OF LEFT HIP, SUBSEQUENT ENCOUNTER: ICD-10-CM

## 2024-01-22 PROCEDURE — 99024 POSTOP FOLLOW-UP VISIT: CPT | Mod: ,,, | Performed by: PHYSICIAN ASSISTANT

## 2024-01-22 PROCEDURE — 73502 X-RAY EXAM HIP UNI 2-3 VIEWS: CPT | Mod: LT,,, | Performed by: SPECIALIST

## 2024-01-22 NOTE — PROGRESS NOTES
Chief Complaint:   Chief Complaint   Patient presents with    Left Hip - Follow-up     Pt was referred by Marciano to check if she needs further treatment for her Lt hip. Pt dislocated her hip on 11/19/23 and has been experiencing soreness in a mild ache when she moves in certain positions.       History of present illness:    73 year old female presents today for a 6 week f/u for her left hip.  She has a history of left KATHERINE 21 years ago with Dr Chaidez and was bending to look under a cabinet and suffered a left hip dislocation nearly 2 months ago.  This was reduced in the hospital and she had a superior posterior hip dislocation.  She has hip precautions following the reduction and now doing well.  She has some lateral soreness but no feelings of instability    History reviewed. No pertinent past medical history.    Past Surgical History:   Procedure Laterality Date    APPENDECTOMY  12/1972    CLOSED REDUCTION OF INJURY OF HIP Left 11/19/2023    Procedure: CLOSED REDUCTION, HIP;  Surgeon: Gutierrez Joiner MD;  Location: [facility];  Service: Orthopedics;  Laterality: Left;    HERNIA REPAIR  1995    JOINT REPLACEMENT  12/2002    Bi lateral hup replacement    SHOULDER SURGERY Right        Current Outpatient Medications   Medication Sig    furosemide (LASIX) 20 MG tablet Take 20 mg by mouth.    gabapentin (NEURONTIN) 600 MG tablet Take 600 mg by mouth 3 (three) times daily.    meloxicam (MOBIC) 15 MG tablet Take 15 mg by mouth.     No current facility-administered medications for this visit.       Review of patient's allergies indicates:   Allergen Reactions    Oxycodone-acetaminophen     Tetracycline        Family History   Problem Relation Age of Onset    Arthritis Mother        Social History     Socioeconomic History    Marital status:    Tobacco Use    Smoking status: Former     Current packs/day: 0.25     Average packs/day: 0.3 packs/day for 5.0 years (1.3 ttl pk-yrs)     Types: Cigarettes    Smokeless  "tobacco: Never   Substance and Sexual Activity    Alcohol use: Yes     Alcohol/week: 14.0 standard drinks of alcohol     Types: 14 Glasses of wine per week     Comment: Weekly    Drug use: Never    Sexual activity: Not Currently     Partners: Male     Birth control/protection: None         Review of Systems:    Constitution:   Denies chills, fever, and sweats.  HENT:   Denies headaches or blurry vision.  Cardiovascular:  Denies chest pain or irregular heart beat.  Respiratory:   Denies cough or shortness of breath.  Gastrointestinal:  Denies abdominal pain, nausea, or vomiting.  Musculoskeletal:   Denies muscle cramps.  Neurological:   Denies dizziness or focal weakness.  Psychiatric/Behavior: Normal mental status.  Hematology/Lymph:  Denies bleeding problem or easy bruising/bleeding.  Skin:    Denies rash or suspicious lesions.    Examination:    Vital Signs:    Vitals:    01/22/24 0909 01/22/24 0910   BP:  (!) 138/94   Pulse:  69   Weight: 69.4 kg (153 lb) 69.4 kg (153 lb)   Height: 5' 5" (1.651 m) 5' 5" (1.651 m)   PainSc:    1       Body mass index is 25.46 kg/m².    Constitution:   Well-developed, well nourished patient in no acute distress.  Neurological:   Alert and oriented x 3 and cooperative to examination.     Psychiatric/Behavior: Normal mental status.  Respiratory:   No shortness of breath.  Nonlabored breathing  Cardiovascular:           Regular rate and rhythm  Eyes:    Extraoccular muscles intact  Skin:    No scars, rash or suspicious lesions.    Physical Exam:     left Hip     No swelling, induration or tenderness    Well healed scar    No instability of the hip was noted. Motion was normal.     No weakness was observed.    Sensation intact distally    Intact pedal pulses    Complete left hip x-ray with two or more views of the AP and lateral aspects were performed.     Impressions Radiology Test   X-ray of hip was performed showed intact hip prosthesis without signs of loosening or subsidence. Mild " eccentric poly wear seen        Assessment:     Dislocation of left hip, subsequent encounter  -     X-Ray Hip 2 or 3 views Left (with Pelvis when performed); Future; Expected date: 01/22/2024    Hx of total hip arthroplasty, left        Plan:      We have discussed exam and x-ray findings with the patient today.  She has a total hip arthroplasty that was performed 21 years ago with an isolated dislocation episode 2 months ago.  Overall she is doing well.  We have discussed the possibility of revision surgery entailing polyethylene exchange as well as femoral head but currently she is doing well and it has not recommended.  We will see her on a six-month basis for further evaluation.  If she notices any feelings of instability or excessive pain she will contact us sooner and we will see her earlier.        Follow up in about 6 months (around 7/22/2024).    DISCLAIMER: This note may have been dictated using voice recognition software and may contain grammatical errors.

## 2024-07-22 ENCOUNTER — OFFICE VISIT (OUTPATIENT)
Dept: ORTHOPEDICS | Facility: CLINIC | Age: 74
End: 2024-07-22
Payer: MEDICARE

## 2024-07-22 DIAGNOSIS — Z96.642 HX OF TOTAL HIP ARTHROPLASTY, LEFT: Primary | ICD-10-CM

## 2024-07-22 PROCEDURE — 99213 OFFICE O/P EST LOW 20 MIN: CPT | Mod: ,,, | Performed by: SPECIALIST

## 2024-07-22 NOTE — PROGRESS NOTES
History reviewed. No pertinent past medical history.    Past Surgical History:   Procedure Laterality Date    APPENDECTOMY  12/1972    CLOSED REDUCTION OF INJURY OF HIP Left 11/19/2023    Procedure: CLOSED REDUCTION, HIP;  Surgeon: Gutierrez Joiner MD;  Location: [facility];  Service: Orthopedics;  Laterality: Left;    HERNIA REPAIR  1995    JOINT REPLACEMENT  12/2002    Bi lateral hup replacement    SHOULDER SURGERY Right        Current Outpatient Medications   Medication Sig    gabapentin (NEURONTIN) 600 MG tablet Take 600 mg by mouth 3 (three) times daily.    meloxicam (MOBIC) 15 MG tablet Take 15 mg by mouth.    furosemide (LASIX) 20 MG tablet Take 20 mg by mouth. (Patient not taking: Reported on 7/22/2024)     No current facility-administered medications for this visit.       Review of patient's allergies indicates:   Allergen Reactions    Oxycodone-acetaminophen     Tetracycline        Family History   Problem Relation Name Age of Onset    Arthritis Mother Lizzy Ott        Social History     Socioeconomic History    Marital status:    Tobacco Use    Smoking status: Former     Current packs/day: 0.25     Average packs/day: 0.3 packs/day for 5.0 years (1.3 ttl pk-yrs)     Types: Cigarettes    Smokeless tobacco: Never   Substance and Sexual Activity    Alcohol use: Yes     Alcohol/week: 14.0 standard drinks of alcohol     Types: 14 Glasses of wine per week     Comment: Weekly    Drug use: Never    Sexual activity: Not Currently     Partners: Male     Birth control/protection: None       Chief Complaint:   Chief Complaint   Patient presents with    Follow-up     L hip f/u since last OVN. States it has been doing well no complaints today        Consulting Physician: No ref. provider found    History of present illness:    This is a 73 y.o. year old female who is doing well 21 years postop from left total hip arthroplasty and right total hip arthroplasty.  She had a left hip dislocation when she was  trying to put on compression hose about 6-7 months ago.  She is doing well and has had no further episodes since then.  She has no pain.  She is doing well and ambulates unassisted.    Review of Systems:    Constitution:   Denies chills, fever, and sweats.  HENT:   Denies headaches or blurry vision.  Cardiovascular:  Denies chest pain or irregular heart beat.  Respiratory:   Denies cough or shortness of breath.  Gastrointestinal:  Denies abdominal pain, nausea, or vomiting.  Musculoskeletal:   Denies muscle cramps.  Neurological:   Denies dizziness or focal weakness.  Psychiatric/Behavior: Normal mental status.  Hematology/Lymph:  Denies bleeding problem or easy bruising/bleeding.  Skin:    Denies rash or suspicious lesions.    Examination:    Vital Signs:  There were no vitals filed for this visit.    There is no height or weight on file to calculate BMI.    Constitution:   Well-developed, well nourished patient in no acute distress.  Neurological:   Alert and oriented x 3 and cooperative to examination.     Psychiatric/Behavior: Normal mental status.  Respiratory:   No shortness of breath.non labored breathing.  Cardiovascular: Regular rate and rhythm  Eyes:    Extraoccular muscles intact  Skin:    No scars, rash or suspicious lesions.    Physical Exam:  Left Hip     No swelling, induration or tenderness    No increased heat    No tenderness    Well healed scar    No instability of the hip was noted. Motion was normal.     No abductor weakness    No weakness was observed.    Sensation intact distally    Intact pedal pulses    Normal motor function    Normal gait    Radiographs of the left hip and pelvis have been reviewed from January 20, 2024 and show stable well-aligned right and left total hip arthroplasties.  No evidence of loosening.  Minimal polyethylene wear in the left hip.       Assessment: Hx of total hip arthroplasty, left        Plan:  Activities as tolerated and follow up annually.  I will see her  back in 1 year and we will obtain an AP pelvis and AP and lateral radiographs of the right and left hips.      DISCLAIMER: This note may have been dictated using voice recognition software and may contain grammatical errors.     NOTE: Consult report sent to referring provider via [facility] [employer].